# Patient Record
Sex: FEMALE | Race: BLACK OR AFRICAN AMERICAN | NOT HISPANIC OR LATINO
[De-identification: names, ages, dates, MRNs, and addresses within clinical notes are randomized per-mention and may not be internally consistent; named-entity substitution may affect disease eponyms.]

---

## 2024-05-13 ENCOUNTER — RX ONLY (OUTPATIENT)
Age: 64
Setting detail: RX ONLY
End: 2024-05-13

## 2024-05-13 ENCOUNTER — APPOINTMENT (RX ONLY)
Dept: URBAN - METROPOLITAN AREA CLINIC 23 | Facility: CLINIC | Age: 64
Setting detail: DERMATOLOGY
End: 2024-05-13

## 2024-05-13 DIAGNOSIS — L30.1 DYSHIDROSIS [POMPHOLYX]: ICD-10-CM | Status: INADEQUATELY CONTROLLED

## 2024-05-13 PROCEDURE — ? PRESCRIPTION

## 2024-05-13 PROCEDURE — ? PRESCRIPTION MEDICATION MANAGEMENT

## 2024-05-13 PROCEDURE — ? COUNSELING

## 2024-05-13 PROCEDURE — 99204 OFFICE O/P NEW MOD 45 MIN: CPT

## 2024-05-13 RX ORDER — BETAMETHASONE DIPROPIONATE 0.5 MG/G
OINTMENT TOPICAL
Qty: 45 | Refills: 2 | Status: ERX | COMMUNITY
Start: 2024-05-13

## 2024-05-13 RX ORDER — BETAMETHASONE DIPROPIONATE 0.5 MG/G
CREAM TOPICAL
Qty: 45 | Refills: 3 | Status: ERX | COMMUNITY
Start: 2024-05-13

## 2024-05-13 RX ORDER — TACROLIMUS 1 MG/G
OINTMENT TOPICAL
Qty: 30 | Refills: 4 | Status: ERX | COMMUNITY
Start: 2024-05-13

## 2024-05-13 RX ADMIN — TACROLIMUS 1: 1 OINTMENT TOPICAL at 00:00

## 2024-05-13 RX ADMIN — BETAMETHASONE DIPROPIONATE 1: 0.5 OINTMENT TOPICAL at 00:00

## 2024-05-13 ASSESSMENT — LOCATION DETAILED DESCRIPTION DERM
LOCATION DETAILED: LEFT ULNAR DORSAL HAND
LOCATION DETAILED: RIGHT ULNAR DORSAL HAND

## 2024-05-13 ASSESSMENT — LOCATION ZONE DERM: LOCATION ZONE: HAND

## 2024-05-13 ASSESSMENT — LOCATION SIMPLE DESCRIPTION DERM
LOCATION SIMPLE: LEFT HAND
LOCATION SIMPLE: RIGHT HAND

## 2024-05-13 NOTE — HPI: [id number] (PATIENT REPORTED)
Where Is Your Eczema Located?: Hands, back
Additional Comments (Use Complete Sentences): betamethasone & eucrisa in the past

## 2024-05-13 NOTE — PROCEDURE: PRESCRIPTION MEDICATION MANAGEMENT
Render In Strict Bullet Format?: No
Detail Level: Zone
Initiate Treatment: betamethasone dipropionate 0.05 % topical ointment \\nQuantity: 45.0 g  Days Supply: 30\\nSig: Apply thin layer TWICE daily for 2 weeks to rash on hands, then stop and switch to tacrolimus ointment TWICE daily for 2 weeks\\n\\ntacrolimus 0.1 % topical ointment \\nQuantity: 30.0 g  Days Supply: 30\\nSig: After 2 weeks of betamethasone, stop and switch to tacrolimus ointment twice daily for 2 weeks

## 2024-06-25 ENCOUNTER — APPOINTMENT (RX ONLY)
Dept: URBAN - METROPOLITAN AREA CLINIC 23 | Facility: CLINIC | Age: 64
Setting detail: DERMATOLOGY
End: 2024-06-25

## 2024-06-25 DIAGNOSIS — L30.1 DYSHIDROSIS [POMPHOLYX]: ICD-10-CM | Status: INADEQUATELY CONTROLLED

## 2024-06-25 PROCEDURE — ? DUPIXENT INITIATION

## 2024-06-25 PROCEDURE — ? PRESCRIPTION MEDICATION MANAGEMENT

## 2024-06-25 PROCEDURE — 99214 OFFICE O/P EST MOD 30 MIN: CPT

## 2024-06-25 PROCEDURE — ? PHOTO-DOCUMENTATION

## 2024-06-25 PROCEDURE — ? TREATMENT GOALS

## 2024-06-25 PROCEDURE — ? COUNSELING

## 2024-06-25 ASSESSMENT — LOCATION SIMPLE DESCRIPTION DERM
LOCATION SIMPLE: LEFT PLANTAR SURFACE
LOCATION SIMPLE: RIGHT PLANTAR SURFACE
LOCATION SIMPLE: LEFT HAND
LOCATION SIMPLE: RIGHT HAND

## 2024-06-25 ASSESSMENT — LOCATION DETAILED DESCRIPTION DERM
LOCATION DETAILED: LEFT THENAR EMINENCE
LOCATION DETAILED: RIGHT RADIAL PALM
LOCATION DETAILED: RIGHT MEDIAL PLANTAR MIDFOOT
LOCATION DETAILED: RIGHT ULNAR DORSAL HAND
LOCATION DETAILED: LEFT ULNAR DORSAL HAND
LOCATION DETAILED: LEFT MEDIAL PLANTAR MIDFOOT

## 2024-06-25 ASSESSMENT — ITCH NUMERIC RATING SCALE: HOW SEVERE IS YOUR ITCHING?: 0

## 2024-06-25 ASSESSMENT — BSA RASH: BSA RASH: 4

## 2024-06-25 ASSESSMENT — LOCATION ZONE DERM
LOCATION ZONE: HAND
LOCATION ZONE: FEET

## 2024-06-25 NOTE — PROCEDURE: DUPIXENT INITIATION
Is Methotrexate Contraindicated?: No
Diagnosis (Required): Atopic Dermatitis/Eczematous Dermatitis
Pregnancy And Lactation Warning Text: There have not been adverse fetal risks in women taking Dupixent while pregnant. It is unknown if this medication is excreted in breast milk.
Detail Level: Zone
Dupixent Monitoring Guidelines: There is no laboratory monitoring requirement with Dupixent.
Dupixent Dosing: 600 mg SC day 0 then 300 mg SC every other week

## 2024-06-25 NOTE — PROCEDURE: PRESCRIPTION MEDICATION MANAGEMENT
Plan: While pending dupixent approval, will continue topicals.
Render In Strict Bullet Format?: No
Continue Regimen: betamethasone dipropionate 0.05% topical ointment: Apply thin layer TWICE daily for 2 weeks to rash on hands, then stop and switch to tacrolimus ointment TWICE daily for 2 weeks\\n\\ntacrolimus 0.1% topical ointment: After 2 weeks of betamethasone, stop and switch to tacrolimus ointment twice daily for 2 weeks
Detail Level: Zone

## 2024-07-09 ENCOUNTER — RX ONLY (OUTPATIENT)
Age: 64
Setting detail: RX ONLY
End: 2024-07-09

## 2024-07-09 RX ORDER — DUPILUMAB 300 MG/2ML
INJECTION, SOLUTION SUBCUTANEOUS
Qty: 4 | Refills: 6 | Status: ERX | COMMUNITY
Start: 2024-07-09

## 2024-07-20 ENCOUNTER — RX ONLY (OUTPATIENT)
Age: 64
Setting detail: RX ONLY
End: 2024-07-20

## 2024-07-20 RX ORDER — DUPILUMAB 300 MG/2ML
INJECTION, SOLUTION SUBCUTANEOUS
Qty: 4 | Refills: 6 | Status: ERX | COMMUNITY
Start: 2024-07-20

## 2024-07-24 ENCOUNTER — APPOINTMENT (RX ONLY)
Dept: URBAN - METROPOLITAN AREA CLINIC 23 | Facility: CLINIC | Age: 64
Setting detail: DERMATOLOGY
End: 2024-07-24

## 2024-07-24 DIAGNOSIS — L30.1 DYSHIDROSIS [POMPHOLYX]: ICD-10-CM | Status: INADEQUATELY CONTROLLED

## 2024-07-24 PROCEDURE — ? ADDITIONAL NOTES

## 2024-07-24 PROCEDURE — ? TREATMENT GOALS

## 2024-07-24 PROCEDURE — 96372 THER/PROPH/DIAG INJ SC/IM: CPT

## 2024-07-24 PROCEDURE — ? PRESCRIPTION MEDICATION MANAGEMENT

## 2024-07-24 PROCEDURE — ? DUPIXENT INJECTION

## 2024-07-24 PROCEDURE — ? COUNSELING

## 2024-07-24 ASSESSMENT — LOCATION SIMPLE DESCRIPTION DERM
LOCATION SIMPLE: LEFT PLANTAR SURFACE
LOCATION SIMPLE: ABDOMEN
LOCATION SIMPLE: RIGHT PLANTAR SURFACE
LOCATION SIMPLE: LEFT HAND
LOCATION SIMPLE: RIGHT HAND

## 2024-07-24 ASSESSMENT — LOCATION DETAILED DESCRIPTION DERM
LOCATION DETAILED: RIGHT MEDIAL PLANTAR MIDFOOT
LOCATION DETAILED: LEFT MEDIAL PLANTAR MIDFOOT
LOCATION DETAILED: PERIUMBILICAL SKIN
LOCATION DETAILED: LEFT ULNAR DORSAL HAND
LOCATION DETAILED: RIGHT RADIAL PALM
LOCATION DETAILED: LEFT THENAR EMINENCE
LOCATION DETAILED: RIGHT ULNAR DORSAL HAND

## 2024-07-24 ASSESSMENT — LOCATION ZONE DERM
LOCATION ZONE: FEET
LOCATION ZONE: HAND
LOCATION ZONE: TRUNK

## 2024-07-24 NOTE — PROCEDURE: DUPIXENT INJECTION
[id number] Billing Preferences: 1
Expiration Date (Optional): 03/31/2026
Consent: The risks of pain and injection site reactions were reviewed with the patient prior to the injection.
Dupixent Dosing: 600 mg
Syringe Size Used (Required For Enhanced Ndc): 300 mg/2ml prefilled pen
Date Of Next Injection: 2 Weeks
Lot # (Optional): [id number]
Ndc (300 Mg Prefilled Syringe): [address]-92
Administered By (Optional): OLIVIER Pearce
Ndc (300 Mg Prefilled Pen): [address]-49
Ndc (200 Mg Prefilled Syringe): [address]-81
Include J-Code In Bill: No
Ndc (200 Mg Prefilled Pen): 0367-0390-78
Render If Medication Purchased By Clinic In Visit Note?: Yes
Detail Level: Detailed
J-Code:

## 2024-07-24 NOTE — PROCEDURE: ADDITIONAL NOTES
Render Risk Assessment In Note?: no
Detail Level: Simple
Additional Notes: Patient did not receive clearance/did not yet discuss starting Dupixent with her neurologist. Discussed that although there is limited literature some studies indicate possible exacerbation of MS on Dupixent. Patient expresses understanding, still wishes to proceed with Dupixent today and will notify her neurologist that she is on Dupixent at next follow-up appointment. Patient will contact me if her neurologist wants her to stop Dupixent

## 2024-07-24 NOTE — PROCEDURE: PRESCRIPTION MEDICATION MANAGEMENT
Render In Strict Bullet Format?: No
Continue Regimen: betamethasone dipropionate 0.05% topical ointment: Apply thin layer TWICE daily for 2 weeks to rash on hands, then stop and switch to tacrolimus ointment TWICE daily for 2 weeks\\n\\ntacrolimus 0.1% topical ointment: After 2 weeks of betamethasone, stop and switch to tacrolimus ointment twice daily for 2 weeks
Detail Level: Zone
Initiate Treatment: Dupixent 600mg SC for loading dose, then 300mg every 14 weeks for maintenance

## 2024-10-24 ENCOUNTER — APPOINTMENT (RX ONLY)
Dept: URBAN - METROPOLITAN AREA CLINIC 23 | Facility: CLINIC | Age: 64
Setting detail: DERMATOLOGY
End: 2024-10-24

## 2024-10-24 DIAGNOSIS — L30.1 DYSHIDROSIS [POMPHOLYX]: ICD-10-CM | Status: WELL CONTROLLED

## 2024-10-24 DIAGNOSIS — L65.0 TELOGEN EFFLUVIUM: ICD-10-CM

## 2024-10-24 PROCEDURE — 99213 OFFICE O/P EST LOW 20 MIN: CPT

## 2024-10-24 PROCEDURE — ? PRESCRIPTION MEDICATION MANAGEMENT

## 2024-10-24 PROCEDURE — ? DUPIXENT MONITORING

## 2024-10-24 PROCEDURE — ? ADDITIONAL NOTES

## 2024-10-24 PROCEDURE — ? TREATMENT GOALS

## 2024-10-24 PROCEDURE — ? COUNSELING

## 2024-10-24 ASSESSMENT — LOCATION DETAILED DESCRIPTION DERM
LOCATION DETAILED: MID-FRONTAL SCALP
LOCATION DETAILED: LEFT MEDIAL PLANTAR MIDFOOT
LOCATION DETAILED: RIGHT RADIAL PALM
LOCATION DETAILED: RIGHT ULNAR DORSAL HAND
LOCATION DETAILED: PERIUMBILICAL SKIN
LOCATION DETAILED: LEFT ULNAR DORSAL HAND
LOCATION DETAILED: RIGHT MEDIAL PLANTAR MIDFOOT
LOCATION DETAILED: LEFT THENAR EMINENCE

## 2024-10-24 ASSESSMENT — LOCATION ZONE DERM
LOCATION ZONE: TRUNK
LOCATION ZONE: HAND
LOCATION ZONE: FEET
LOCATION ZONE: SCALP

## 2024-10-24 ASSESSMENT — LOCATION SIMPLE DESCRIPTION DERM
LOCATION SIMPLE: RIGHT HAND
LOCATION SIMPLE: RIGHT PLANTAR SURFACE
LOCATION SIMPLE: LEFT PLANTAR SURFACE
LOCATION SIMPLE: ANTERIOR SCALP
LOCATION SIMPLE: ABDOMEN
LOCATION SIMPLE: LEFT HAND

## 2024-10-24 NOTE — PROCEDURE: PRESCRIPTION MEDICATION MANAGEMENT
Plan: Pt stated she has been experiencing increased hair shedding, concerned it is related to Dupixent injections. She wants to transition to using dupixent qmonthly. Counseled on effects of using medication monthly instead of biweekly, discussed risk of flaring and recommended to resume taking medication biweekly if pt begins flaring on a monthly dosage.
Render In Strict Bullet Format?: No
Continue Regimen: Dupixent 300mg every 14 weeks for maintenance
Detail Level: Zone

## 2024-12-09 ENCOUNTER — APPOINTMENT (OUTPATIENT)
Dept: URBAN - METROPOLITAN AREA CLINIC 23 | Facility: CLINIC | Age: 64
Setting detail: DERMATOLOGY
End: 2024-12-09

## 2024-12-09 DIAGNOSIS — L30.1 DYSHIDROSIS [POMPHOLYX]: ICD-10-CM | Status: INADEQUATELY CONTROLLED

## 2024-12-09 PROCEDURE — ? COUNSELING

## 2024-12-09 PROCEDURE — ? TREATMENT GOALS

## 2024-12-09 PROCEDURE — 99213 OFFICE O/P EST LOW 20 MIN: CPT

## 2024-12-09 PROCEDURE — ? DUPIXENT MONITORING

## 2024-12-09 PROCEDURE — ? PRESCRIPTION MEDICATION MANAGEMENT

## 2024-12-09 ASSESSMENT — LOCATION SIMPLE DESCRIPTION DERM
LOCATION SIMPLE: RIGHT HAND
LOCATION SIMPLE: LEFT HAND

## 2024-12-09 ASSESSMENT — LOCATION ZONE DERM: LOCATION ZONE: HAND

## 2024-12-09 ASSESSMENT — LOCATION DETAILED DESCRIPTION DERM
LOCATION DETAILED: LEFT ULNAR DORSAL HAND
LOCATION DETAILED: RIGHT RADIAL DORSAL HAND

## 2024-12-09 NOTE — PROCEDURE: PRESCRIPTION MEDICATION MANAGEMENT
Plan: Pt stated she has been experiencing increased hair shedding, concerned it is related to Dupixent injections. She d/c Dupixent for a short period of time and flared. Pt restarted Dupixent 12/3/24 and will continue bi weekly injections. In the future pt wants to transition to using dupixent qmonthly. Counseled on effects of using medication monthly instead of biweekly, discussed risk of flaring and recommended to resume taking medication biweekly if pt begins flaring on a monthly dosage.
Render In Strict Bullet Format?: No
Continue Regimen: Dupixent 300mg biweekly\\nBetamethasone dipropionate 0.05% ointment BID prn\\nTacrolimus 0.1% ointment BID prn
Detail Level: Zone